# Patient Record
Sex: MALE | Race: WHITE | URBAN - METROPOLITAN AREA
[De-identification: names, ages, dates, MRNs, and addresses within clinical notes are randomized per-mention and may not be internally consistent; named-entity substitution may affect disease eponyms.]

---

## 2020-08-27 ENCOUNTER — NURSE TRIAGE (OUTPATIENT)
Dept: NURSING | Facility: CLINIC | Age: 35
End: 2020-08-27
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
## 2023-03-14 ENCOUNTER — NURSE TRIAGE (OUTPATIENT)
Dept: FAMILY MEDICINE | Facility: CLINIC | Age: 38
End: 2023-03-14
Payer: COMMERCIAL

## 2023-03-14 NOTE — TELEPHONE ENCOUNTER
Triage Call:    Caller: Patient  Patient stepped on a nail at work today through work boot and is calling to check when last teatnus shot was.  It was in 2014.  Rating pain 2/10.      Protocol Recommended Disposition: Be seen in the next 4 hours.      Caller verbalized understanding of instructions and questions answered.      Berkley Alvarez RN on 3/14/2023 at 5:40 PM        Reason for Disposition    [1] Puncture wound of foot AND [2] puncture went through shoe (e.g., tennis shoe)    Additional Information    Negative: [1] Puncture wound of head, neck, chest, back, or abdomen AND [2] sounds life-threatening to the triager    Negative: Shock suspected (e.g., cold/pale/clammy skin, too weak to stand, low BP, rapid pulse)    Negative: Sounds like a life-threatening emergency to the triager    Negative: [1] Puncture wound of head, neck, chest, abdomen, or overlying a joint AND [2] could be deep    Negative: High pressure injection injury (e.g., from grease gun or paint gun, usually work-related)    Negative: Sounds like a serious injury to the triager    Negative: [1] SEVERE pain AND [2] not improved 2 hours after pain medicine    Negative: [1] Puncture wound of foot AND [2] hurts too much to walk on it (i.e., unable to bear weight, severe limp)    Negative: [1] Puncture wound of bare foot (no shoes) AND [2] setting was dirty  (Exception: shallow puncture)    Protocols used: PUNCTURE WOUND-A-